# Patient Record
Sex: FEMALE | Race: ASIAN | NOT HISPANIC OR LATINO
[De-identification: names, ages, dates, MRNs, and addresses within clinical notes are randomized per-mention and may not be internally consistent; named-entity substitution may affect disease eponyms.]

---

## 2022-02-01 PROBLEM — Z00.00 ENCOUNTER FOR PREVENTIVE HEALTH EXAMINATION: Status: ACTIVE | Noted: 2022-02-01

## 2022-02-03 ENCOUNTER — APPOINTMENT (OUTPATIENT)
Dept: ANTEPARTUM | Facility: CLINIC | Age: 27
End: 2022-02-03
Payer: COMMERCIAL

## 2022-02-03 ENCOUNTER — APPOINTMENT (OUTPATIENT)
Dept: OBGYN | Facility: CLINIC | Age: 27
End: 2022-02-03
Payer: COMMERCIAL

## 2022-02-03 VITALS
HEIGHT: 66 IN | BODY MASS INDEX: 26.84 KG/M2 | DIASTOLIC BLOOD PRESSURE: 70 MMHG | WEIGHT: 167 LBS | SYSTOLIC BLOOD PRESSURE: 115 MMHG

## 2022-02-03 PROCEDURE — XXXXX: CPT

## 2022-02-03 PROCEDURE — 76811 OB US DETAILED SNGL FETUS: CPT

## 2022-02-03 PROCEDURE — 76817 TRANSVAGINAL US OBSTETRIC: CPT

## 2022-02-03 RX ORDER — ASCORBIC ACID, CHOLECALCIFEROL, .ALPHA.-TOCOPHEROL ACETATE, DL-, PYRIDOXINE, FOLIC ACID, CYANOCOBALAMIN, CALCIUM, FERROUS FUMARATE, MAGNESIUM, DOCONEXENT 85; 200; 10; 25; 1; 12; 140; 27; 45; 300 [IU]/1; [IU]/1; [IU]/1; [IU]/1; MG/1; UG/1; MG/1; MG/1; MG/1; MG/1
27-0.6-0.4-3 CAPSULE, GELATIN COATED ORAL
Qty: 30 | Refills: 8 | Status: ACTIVE | COMMUNITY
Start: 2022-02-03 | End: 1900-01-01

## 2022-03-03 ENCOUNTER — APPOINTMENT (OUTPATIENT)
Dept: OBGYN | Facility: CLINIC | Age: 27
End: 2022-03-03
Payer: COMMERCIAL

## 2022-03-03 VITALS
HEIGHT: 66 IN | WEIGHT: 174 LBS | BODY MASS INDEX: 27.97 KG/M2 | DIASTOLIC BLOOD PRESSURE: 75 MMHG | SYSTOLIC BLOOD PRESSURE: 119 MMHG

## 2022-03-03 DIAGNOSIS — Z3A.20 20 WEEKS GESTATION OF PREGNANCY: ICD-10-CM

## 2022-03-03 PROCEDURE — 0502F SUBSEQUENT PRENATAL CARE: CPT

## 2022-03-28 ENCOUNTER — NON-APPOINTMENT (OUTPATIENT)
Age: 27
End: 2022-03-28

## 2022-03-31 ENCOUNTER — NON-APPOINTMENT (OUTPATIENT)
Age: 27
End: 2022-03-31

## 2022-03-31 ENCOUNTER — APPOINTMENT (OUTPATIENT)
Dept: OBGYN | Facility: CLINIC | Age: 27
End: 2022-03-31
Payer: COMMERCIAL

## 2022-03-31 VITALS — DIASTOLIC BLOOD PRESSURE: 74 MMHG | SYSTOLIC BLOOD PRESSURE: 117 MMHG | WEIGHT: 178 LBS | BODY MASS INDEX: 28.73 KG/M2

## 2022-03-31 PROCEDURE — 0502F SUBSEQUENT PRENATAL CARE: CPT

## 2022-04-01 ENCOUNTER — NON-APPOINTMENT (OUTPATIENT)
Age: 27
End: 2022-04-01

## 2022-04-01 LAB
BASOPHILS # BLD AUTO: 0.01 K/UL
BASOPHILS NFR BLD AUTO: 0.1 %
EOSINOPHIL # BLD AUTO: 0.07 K/UL
EOSINOPHIL NFR BLD AUTO: 0.5 %
GLUCOSE 1H P 50 G GLC PO SERPL-MCNC: 139 MG/DL
HCT VFR BLD CALC: 27.9 %
HGB BLD-MCNC: 8.5 G/DL
IMM GRANULOCYTES NFR BLD AUTO: 0.8 %
LYMPHOCYTES # BLD AUTO: 1.77 K/UL
LYMPHOCYTES NFR BLD AUTO: 13.3 %
MAN DIFF?: NORMAL
MCHC RBC-ENTMCNC: 27.2 PG
MCHC RBC-ENTMCNC: 30.5 GM/DL
MCV RBC AUTO: 89.4 FL
MONOCYTES # BLD AUTO: 0.49 K/UL
MONOCYTES NFR BLD AUTO: 3.7 %
NEUTROPHILS # BLD AUTO: 10.82 K/UL
NEUTROPHILS NFR BLD AUTO: 81.6 %
PLATELET # BLD AUTO: 318 K/UL
RBC # BLD: 3.12 M/UL
RBC # FLD: 13.1 %
WBC # FLD AUTO: 13.27 K/UL

## 2022-04-14 ENCOUNTER — APPOINTMENT (OUTPATIENT)
Dept: ANTEPARTUM | Facility: CLINIC | Age: 27
End: 2022-04-14
Payer: COMMERCIAL

## 2022-04-14 ENCOUNTER — APPOINTMENT (OUTPATIENT)
Dept: OBGYN | Facility: CLINIC | Age: 27
End: 2022-04-14
Payer: COMMERCIAL

## 2022-04-14 VITALS
DIASTOLIC BLOOD PRESSURE: 53 MMHG | SYSTOLIC BLOOD PRESSURE: 91 MMHG | HEIGHT: 66 IN | BODY MASS INDEX: 29.57 KG/M2 | WEIGHT: 184 LBS

## 2022-04-14 PROCEDURE — 0502F SUBSEQUENT PRENATAL CARE: CPT

## 2022-04-14 PROCEDURE — 76819 FETAL BIOPHYS PROFIL W/O NST: CPT

## 2022-04-14 PROCEDURE — 76816 OB US FOLLOW-UP PER FETUS: CPT

## 2022-04-18 ENCOUNTER — APPOINTMENT (OUTPATIENT)
Dept: OBGYN | Facility: CLINIC | Age: 27
End: 2022-04-18

## 2022-04-28 ENCOUNTER — NON-APPOINTMENT (OUTPATIENT)
Age: 27
End: 2022-04-28

## 2022-04-28 ENCOUNTER — APPOINTMENT (OUTPATIENT)
Dept: OBGYN | Facility: CLINIC | Age: 27
End: 2022-04-28
Payer: COMMERCIAL

## 2022-04-28 VITALS
HEIGHT: 66 IN | WEIGHT: 184 LBS | SYSTOLIC BLOOD PRESSURE: 125 MMHG | BODY MASS INDEX: 29.57 KG/M2 | DIASTOLIC BLOOD PRESSURE: 78 MMHG

## 2022-04-28 DIAGNOSIS — Z3A.24 24 WEEKS GESTATION OF PREGNANCY: ICD-10-CM

## 2022-04-28 PROCEDURE — 0502F SUBSEQUENT PRENATAL CARE: CPT

## 2022-04-28 PROCEDURE — 90715 TDAP VACCINE 7 YRS/> IM: CPT

## 2022-04-28 PROCEDURE — 90471 IMMUNIZATION ADMIN: CPT

## 2022-05-02 LAB — FOLATE SERPL-MCNC: 15.3 NG/ML

## 2022-05-12 ENCOUNTER — APPOINTMENT (OUTPATIENT)
Dept: OBGYN | Facility: CLINIC | Age: 27
End: 2022-05-12
Payer: COMMERCIAL

## 2022-05-12 VITALS — WEIGHT: 189 LBS | DIASTOLIC BLOOD PRESSURE: 74 MMHG | BODY MASS INDEX: 30.51 KG/M2 | SYSTOLIC BLOOD PRESSURE: 106 MMHG

## 2022-05-12 DIAGNOSIS — Z3A.34 34 WEEKS GESTATION OF PREGNANCY: ICD-10-CM

## 2022-05-12 DIAGNOSIS — O99.019 ANEMIA COMPLICATING PREGNANCY, UNSPECIFIED TRIMESTER: ICD-10-CM

## 2022-05-12 DIAGNOSIS — Z33.1 PREGNANT STATE, INCIDENTAL: ICD-10-CM

## 2022-05-12 DIAGNOSIS — Z3A.32 32 WEEKS GESTATION OF PREGNANCY: ICD-10-CM

## 2022-05-12 PROCEDURE — 0502F SUBSEQUENT PRENATAL CARE: CPT

## 2022-05-12 RX ORDER — DOXYLAMINE SUCCINATE AND PYRIDOXINE HYDROCHLORIDE 10; 10 MG/1; MG/1
10-10 TABLET, DELAYED RELEASE ORAL
Refills: 0 | Status: COMPLETED | COMMUNITY
End: 2022-05-12

## 2022-05-12 RX ORDER — IRON/IRON ASP GLY/FA/MV-MIN 38 125-25-1MG
TABLET ORAL
Refills: 0 | Status: ACTIVE | COMMUNITY

## 2022-05-26 ENCOUNTER — APPOINTMENT (OUTPATIENT)
Dept: OBGYN | Facility: CLINIC | Age: 27
End: 2022-05-26
Payer: COMMERCIAL

## 2022-05-26 VITALS
DIASTOLIC BLOOD PRESSURE: 74 MMHG | WEIGHT: 193 LBS | BODY MASS INDEX: 31.02 KG/M2 | HEIGHT: 66 IN | SYSTOLIC BLOOD PRESSURE: 111 MMHG

## 2022-05-26 PROCEDURE — 0502F SUBSEQUENT PRENATAL CARE: CPT

## 2022-06-01 LAB
B-HEM STREP SPEC QL CULT: NORMAL
FERRITIN SERPL-MCNC: 13 NG/ML
HIV1+2 AB SPEC QL IA.RAPID: NONREACTIVE
IRON SATN MFR SERPL: 6 %
IRON SERPL-MCNC: 34 UG/DL
TIBC SERPL-MCNC: 544 UG/DL
UIBC SERPL-MCNC: 510 UG/DL
VIT B12 SERPL-MCNC: <150 PG/ML

## 2022-06-02 ENCOUNTER — APPOINTMENT (OUTPATIENT)
Dept: OBGYN | Facility: CLINIC | Age: 27
End: 2022-06-02
Payer: COMMERCIAL

## 2022-06-02 VITALS
WEIGHT: 199 LBS | SYSTOLIC BLOOD PRESSURE: 110 MMHG | HEIGHT: 66 IN | BODY MASS INDEX: 31.98 KG/M2 | DIASTOLIC BLOOD PRESSURE: 76 MMHG

## 2022-06-02 PROCEDURE — 0502F SUBSEQUENT PRENATAL CARE: CPT

## 2022-06-10 ENCOUNTER — APPOINTMENT (OUTPATIENT)
Dept: OBGYN | Facility: CLINIC | Age: 27
End: 2022-06-10

## 2022-06-10 ENCOUNTER — APPOINTMENT (OUTPATIENT)
Dept: ANTEPARTUM | Facility: CLINIC | Age: 27
End: 2022-06-10
Payer: COMMERCIAL

## 2022-06-10 VITALS
WEIGHT: 194 LBS | HEIGHT: 66 IN | SYSTOLIC BLOOD PRESSURE: 134 MMHG | DIASTOLIC BLOOD PRESSURE: 84 MMHG | BODY MASS INDEX: 31.18 KG/M2

## 2022-06-10 DIAGNOSIS — Z3A.38 38 WEEKS GESTATION OF PREGNANCY: ICD-10-CM

## 2022-06-10 DIAGNOSIS — Z34.90 ENCOUNTER FOR SUPERVISION OF NORMAL PREGNANCY, UNSPECIFIED, UNSPECIFIED TRIMESTER: ICD-10-CM

## 2022-06-10 PROCEDURE — 76819 FETAL BIOPHYS PROFIL W/O NST: CPT

## 2022-06-10 PROCEDURE — 0502F SUBSEQUENT PRENATAL CARE: CPT

## 2022-06-10 PROCEDURE — 76816 OB US FOLLOW-UP PER FETUS: CPT

## 2022-06-16 ENCOUNTER — APPOINTMENT (OUTPATIENT)
Dept: OBGYN | Facility: CLINIC | Age: 27
End: 2022-06-16

## 2022-06-16 VITALS
HEIGHT: 66 IN | WEIGHT: 195 LBS | SYSTOLIC BLOOD PRESSURE: 113 MMHG | BODY MASS INDEX: 31.34 KG/M2 | DIASTOLIC BLOOD PRESSURE: 67 MMHG

## 2022-06-16 PROCEDURE — 0502F SUBSEQUENT PRENATAL CARE: CPT

## 2022-06-18 ENCOUNTER — OUTPATIENT (OUTPATIENT)
Dept: INPATIENT UNIT | Facility: HOSPITAL | Age: 27
LOS: 1 days | Discharge: HOME | End: 2022-06-18

## 2022-06-18 VITALS — HEART RATE: 85 BPM | SYSTOLIC BLOOD PRESSURE: 99 MMHG | DIASTOLIC BLOOD PRESSURE: 56 MMHG

## 2022-06-18 VITALS
HEART RATE: 116 BPM | TEMPERATURE: 98 F | RESPIRATION RATE: 16 BRPM | SYSTOLIC BLOOD PRESSURE: 111 MMHG | DIASTOLIC BLOOD PRESSURE: 76 MMHG

## 2022-06-18 DIAGNOSIS — Z3A.00 WEEKS OF GESTATION OF PREGNANCY NOT SPECIFIED: ICD-10-CM

## 2022-06-18 DIAGNOSIS — O26.899 OTHER SPECIFIED PREGNANCY RELATED CONDITIONS, UNSPECIFIED TRIMESTER: ICD-10-CM

## 2022-06-18 NOTE — OB PROVIDER TRIAGE NOTE - NSHPPHYSICALEXAM_GEN_ALL_CORE
pt seen and examined   ICU Vital Signs Last 24 Hrs  T(C): 36.9 (18 Jun 2022 11:20), Max: 36.9 (18 Jun 2022 11:15)  T(F): 98.42 (18 Jun 2022 11:20), Max: 98.42 (18 Jun 2022 11:20)  HR: 85 (18 Jun 2022 12:03) (85 - 116)  BP: 99/56 (18 Jun 2022 12:03) (99/56 - 111/76)  BP(mean): --  ABP: --  ABP(mean): --  RR: 16 (18 Jun 2022 11:15) (16 - 16)  SpO2: --  pt in NAD    pt does not appear to be in labor   lungs clear   hear s1 s2   abd soft   placed in EFM   FHR  reactive no contraction pattern   VE attempted unable to exam  tried x2     vaginismus suspected   declined SSE as well      Dr Potter aware

## 2022-06-18 NOTE — OB RN TRIAGE NOTE - FALL HARM RISK - UNIVERSAL INTERVENTIONS
Bed in lowest position, wheels locked, appropriate side rails in place/Call bell, personal items and telephone in reach/Instruct patient to call for assistance before getting out of bed or chair/Non-slip footwear when patient is out of bed/[place] to call system/Physically safe environment - no spills, clutter or unnecessary equipment/Purposeful Proactive Rounding/Room/bathroom lighting operational, light cord in reach

## 2022-06-18 NOTE — OB PROVIDER TRIAGE NOTE - HISTORY OF PRESENT ILLNESS
27 year old female P0 at 39.5 weeks gestation  27 year old female P0 at 39.5 weeks gestation who presents with contractions q 5 minutes and stated pain 8/10    denied any LOF VB   feels GFM     denied any ap issues denied any fetal issues    stated EFW  1 weeks ago  6/10/22  8#      med hx denied   surghx denied   meds pnvqd   allergies nkda  Ob hx  P0   gyn hx denied

## 2022-06-18 NOTE — OB PROVIDER TRIAGE NOTE - NSOBPROVIDERNOTE_OBGYN_ALL_OB_FT
27 year old female P0 at 39 weeks  no evidence of active labor    no contraction pattern on NST   RNST   declined VE   vaginismus suspected  clinically does not appear in active labor at this time        case d/w Dr abebe   discharge home   labor instruction reviewed advised to return if stronger contractions  any VB any LOF  any Dec FM   office follow up next week   FM counts reviewed   Prasanth NOVA

## 2022-06-19 ENCOUNTER — INPATIENT (INPATIENT)
Facility: HOSPITAL | Age: 27
LOS: 2 days | Discharge: HOME | End: 2022-06-22
Attending: OBSTETRICS & GYNECOLOGY | Admitting: OBSTETRICS & GYNECOLOGY

## 2022-06-19 ENCOUNTER — TRANSCRIPTION ENCOUNTER (OUTPATIENT)
Age: 27
End: 2022-06-19

## 2022-06-19 VITALS
DIASTOLIC BLOOD PRESSURE: 59 MMHG | RESPIRATION RATE: 20 BRPM | HEIGHT: 66 IN | SYSTOLIC BLOOD PRESSURE: 100 MMHG | HEART RATE: 82 BPM | WEIGHT: 179.9 LBS | TEMPERATURE: 98 F

## 2022-06-19 DIAGNOSIS — Z3A.00 WEEKS OF GESTATION OF PREGNANCY NOT SPECIFIED: ICD-10-CM

## 2022-06-19 DIAGNOSIS — O26.899 OTHER SPECIFIED PREGNANCY RELATED CONDITIONS, UNSPECIFIED TRIMESTER: ICD-10-CM

## 2022-06-19 PROBLEM — Z78.9 OTHER SPECIFIED HEALTH STATUS: Chronic | Status: ACTIVE | Noted: 2022-06-18

## 2022-06-19 LAB
ANISOCYTOSIS BLD QL: SLIGHT — SIGNIFICANT CHANGE UP
BASOPHILS # BLD AUTO: 0 K/UL — SIGNIFICANT CHANGE UP (ref 0–0.2)
BASOPHILS NFR BLD AUTO: 0 % — SIGNIFICANT CHANGE UP (ref 0–2)
BLD GP AB SCN SERPL QL: POSITIVE — SIGNIFICANT CHANGE UP
COVID-19 SPIKE DOMAIN AB INTERP: POSITIVE
COVID-19 SPIKE DOMAIN ANTIBODY RESULT: >250 U/ML — HIGH
EOSINOPHIL # BLD AUTO: 0 K/UL — SIGNIFICANT CHANGE UP (ref 0–0.5)
EOSINOPHIL NFR BLD AUTO: 0 % — SIGNIFICANT CHANGE UP (ref 0–6)
HCT VFR BLD CALC: 32.6 % — LOW (ref 34.5–45)
HGB BLD-MCNC: 10.2 G/DL — LOW (ref 11.5–15.5)
IANC: 20.01 K/UL — HIGH (ref 1.8–7.4)
LYMPHOCYTES # BLD AUTO: 0.2 K/UL — LOW (ref 1–3.3)
LYMPHOCYTES # BLD AUTO: 0.9 % — LOW (ref 13–44)
MCHC RBC-ENTMCNC: 25.9 PG — LOW (ref 27–34)
MCHC RBC-ENTMCNC: 31.3 GM/DL — LOW (ref 32–36)
MCV RBC AUTO: 82.7 FL — SIGNIFICANT CHANGE UP (ref 80–100)
MONOCYTES # BLD AUTO: 1.17 K/UL — HIGH (ref 0–0.9)
MONOCYTES NFR BLD AUTO: 5.2 % — SIGNIFICANT CHANGE UP (ref 2–14)
NEUTROPHILS # BLD AUTO: 21.14 K/UL — HIGH (ref 1.8–7.4)
NEUTROPHILS NFR BLD AUTO: 93.9 % — HIGH (ref 43–77)
PLAT MORPH BLD: NORMAL — SIGNIFICANT CHANGE UP
PLATELET # BLD AUTO: 331 K/UL — SIGNIFICANT CHANGE UP (ref 150–400)
PLATELET COUNT - ESTIMATE: NORMAL — SIGNIFICANT CHANGE UP
POIKILOCYTOSIS BLD QL AUTO: SLIGHT — SIGNIFICANT CHANGE UP
RBC # BLD: 3.94 M/UL — SIGNIFICANT CHANGE UP (ref 3.8–5.2)
RBC # FLD: 15.9 % — HIGH (ref 10.3–14.5)
RBC BLD AUTO: NORMAL — SIGNIFICANT CHANGE UP
RH IG SCN BLD-IMP: POSITIVE — SIGNIFICANT CHANGE UP
RH IG SCN BLD-IMP: POSITIVE — SIGNIFICANT CHANGE UP
SARS-COV-2 IGG+IGM SERPL QL IA: >250 U/ML — HIGH
SARS-COV-2 IGG+IGM SERPL QL IA: POSITIVE
SARS-COV-2 RNA SPEC QL NAA+PROBE: SIGNIFICANT CHANGE UP
WBC # BLD: 22.51 K/UL — HIGH (ref 3.8–10.5)
WBC # FLD AUTO: 22.51 K/UL — HIGH (ref 3.8–10.5)

## 2022-06-19 PROCEDURE — 59510 CESAREAN DELIVERY: CPT | Mod: U9

## 2022-06-19 RX ORDER — SODIUM CHLORIDE 9 MG/ML
1000 INJECTION, SOLUTION INTRAVENOUS ONCE
Refills: 0 | Status: COMPLETED | OUTPATIENT
Start: 2022-06-19 | End: 2022-06-19

## 2022-06-19 RX ORDER — INFLUENZA VIRUS VACCINE 15; 15; 15; 15 UG/.5ML; UG/.5ML; UG/.5ML; UG/.5ML
0.5 SUSPENSION INTRAMUSCULAR ONCE
Refills: 0 | Status: DISCONTINUED | OUTPATIENT
Start: 2022-06-19 | End: 2022-06-22

## 2022-06-19 RX ORDER — OXYTOCIN 10 UNIT/ML
VIAL (ML) INJECTION
Qty: 20 | Refills: 0 | Status: DISCONTINUED | OUTPATIENT
Start: 2022-06-19 | End: 2022-06-20

## 2022-06-19 RX ORDER — SODIUM CHLORIDE 9 MG/ML
1000 INJECTION, SOLUTION INTRAVENOUS
Refills: 0 | Status: DISCONTINUED | OUTPATIENT
Start: 2022-06-19 | End: 2022-06-20

## 2022-06-19 RX ORDER — MORPHINE SULFATE 50 MG/1
4 CAPSULE, EXTENDED RELEASE ORAL ONCE
Refills: 0 | Status: DISCONTINUED | OUTPATIENT
Start: 2022-06-19 | End: 2022-06-19

## 2022-06-19 RX ORDER — OXYTOCIN 10 UNIT/ML
VIAL (ML) INJECTION
Qty: 30 | Refills: 0 | Status: DISCONTINUED | OUTPATIENT
Start: 2022-06-19 | End: 2022-06-20

## 2022-06-19 RX ORDER — ACETAMINOPHEN 500 MG
975 TABLET ORAL ONCE
Refills: 0 | Status: COMPLETED | OUTPATIENT
Start: 2022-06-19 | End: 2022-06-19

## 2022-06-19 RX ADMIN — Medication 975 MILLIGRAM(S): at 23:18

## 2022-06-19 RX ADMIN — MORPHINE SULFATE 4 MILLIGRAM(S): 50 CAPSULE, EXTENDED RELEASE ORAL at 12:15

## 2022-06-19 RX ADMIN — Medication 2 MILLIUNIT(S)/MIN: at 23:28

## 2022-06-19 RX ADMIN — MORPHINE SULFATE 4 MILLIGRAM(S): 50 CAPSULE, EXTENDED RELEASE ORAL at 11:40

## 2022-06-19 RX ADMIN — MORPHINE SULFATE 4 MILLIGRAM(S): 50 CAPSULE, EXTENDED RELEASE ORAL at 11:41

## 2022-06-19 RX ADMIN — Medication 975 MILLIGRAM(S): at 00:00

## 2022-06-19 RX ADMIN — SODIUM CHLORIDE 1000 MILLILITER(S): 9 INJECTION, SOLUTION INTRAVENOUS at 23:16

## 2022-06-19 NOTE — OB PROVIDER TRIAGE NOTE - NSHPPHYSICALEXAM_GEN_ALL_CORE
Vital Signs Last 24 Hrs  T(C): 36.9 (19 Jun 2022 10:12), Max: 36.9 (18 Jun 2022 11:15)  T(F): 98.4 (19 Jun 2022 10:12), Max: 98.42 (18 Jun 2022 11:20)  HR: 82 (19 Jun 2022 10:12) (82 - 116)  BP: 100/59 (19 Jun 2022 10:12) (99/56 - 111/76)  BP(mean): --  RR: 20 (19 Jun 2022 10:12) (16 - 20)  SpO2: --    A&O x3  CTAB  abdomen: gravid, soft, nontender  Pt declined SVE, vaginismus suspected, pt states she " scared of vaginal exam"   NST: 130 baseline, moderate variabiloty, positive accels, no decels, mild irregular contractions

## 2022-06-19 NOTE — OB PROVIDER H&P - ASSESSMENT
This is a 27 year old  at 39.6 weeks gestational age admitted for theraputic rest    plan discussed with dr abebe  admit for theraputic rest  Mosrphone 4 mg IVP/ 4 mg SubQ  routine orders  covid swab collected and sent  This is a 27 year old  at 39.6 weeks gestational age admitted for theraputic rest    plan discussed with dr abebe  admit for theraputic rest  Mosrphone 4 mg IVP/ 4 mg SubQ  routine orders  covid swab collected and sent       Attending Addendum: In room to discuss plan of care with patient. Pt is a P0 at 39+6wga presenting for the second day with irregular contractions. Upon evaluation yesterday, patient unable to tolerate digital exam and today declines a pelvic exam. We discussed different management options including morphine rest, epidural placement followed by IOL. Pt and her partner report that she has not slept in over 3 days and is very tired. She is also scared of the pain and would like for it to stop. Discussed that we typically do not perform elective PCSs as this has risks of intraabdominal surgery and also has added risks to any future pregnancies. We also discussed that a  section is a major abdominal surgery which will cause her to be in significant pain afterwards. She would like to proceed with morphine rest and then follow up with Dr. Arvizu tomorrow to discuss the plan for delivery.   Per patient, Dr. Arvizu previously stated that she should be delivered around 40 weeks but mentioned both induction and  section.    JEFFREY Manriquez MD

## 2022-06-19 NOTE — OB RN PATIENT PROFILE - FALL HARM RISK - UNIVERSAL INTERVENTIONS
Bed in lowest position, wheels locked, appropriate side rails in place/Call bell, personal items and telephone in reach/Instruct patient to call for assistance before getting out of bed or chair/Non-slip footwear when patient is out of bed/[place] to call system/Physically safe environment - no spills, clutter or unnecessary equipment/Purposeful Proactive Rounding/Room/bathroom lighting operational, light cord in reach

## 2022-06-19 NOTE — OB PROVIDER TRIAGE NOTE - HISTORY OF PRESENT ILLNESS
This is a 27 year old  at 39.6 weeks gestational age presents with complaints of spotting and contractions q 5-10 minutes. Reports +GFM< denies LOF.   Pt was seen and evaluated yesterday  in D&T for similar complaints, however was unable to tolerate SVE or SSE.   Pt returned today with same complaints, however is refusing SVE. Pt states she is " scared of vaginal delivery" and " desires c/s"  AP course complicated by:  - 8 lbs efw on 6/10  GBS neg     med: anemia  surg: denies  gyn: denies  ob: denies  current meds: pnv, iron  NKDA

## 2022-06-19 NOTE — OB PROVIDER TRIAGE NOTE - NSOBPROVIDERNOTE_OBGYN_ALL_OB_FT
0845  Dr Abebe aware that patient is uncomfortable with contractions, but is declining SVE and requesting primary c/s    0900  Dr Abebe at bedside discussing plan of care with patient. All patient questions and concerns answered. Plan for therapeutic rest and morphine for pain management    This is a 27 year old  at 39.6 weeks gestational age admitted for theraputic rest    plan discussed with dr abebe  admit for theraputic rest  Mosrphone 4 mg IVP/ 4 mg SubQ  routine orders  covid swab collected and sent

## 2022-06-20 LAB — T PALLIDUM AB TITR SER: NEGATIVE — SIGNIFICANT CHANGE UP

## 2022-06-20 DEVICE — ARISTA 3GR: Type: IMPLANTABLE DEVICE | Status: FUNCTIONAL

## 2022-06-20 RX ORDER — LANOLIN
1 OINTMENT (GRAM) TOPICAL EVERY 6 HOURS
Refills: 0 | Status: DISCONTINUED | OUTPATIENT
Start: 2022-06-20 | End: 2022-06-22

## 2022-06-20 RX ORDER — SODIUM CHLORIDE 9 MG/ML
1000 INJECTION, SOLUTION INTRAVENOUS
Refills: 0 | Status: DISCONTINUED | OUTPATIENT
Start: 2022-06-20 | End: 2022-06-20

## 2022-06-20 RX ORDER — HEPARIN SODIUM 5000 [USP'U]/ML
5000 INJECTION INTRAVENOUS; SUBCUTANEOUS EVERY 12 HOURS
Refills: 0 | Status: DISCONTINUED | OUTPATIENT
Start: 2022-06-20 | End: 2022-06-22

## 2022-06-20 RX ORDER — KETOROLAC TROMETHAMINE 30 MG/ML
30 SYRINGE (ML) INJECTION EVERY 6 HOURS
Refills: 0 | Status: DISCONTINUED | OUTPATIENT
Start: 2022-06-20 | End: 2022-06-21

## 2022-06-20 RX ORDER — ACETAMINOPHEN 500 MG
975 TABLET ORAL
Refills: 0 | Status: DISCONTINUED | OUTPATIENT
Start: 2022-06-20 | End: 2022-06-22

## 2022-06-20 RX ORDER — IBUPROFEN 200 MG
600 TABLET ORAL EVERY 6 HOURS
Refills: 0 | Status: DISCONTINUED | OUTPATIENT
Start: 2022-06-20 | End: 2022-06-22

## 2022-06-20 RX ORDER — DIPHENHYDRAMINE HCL 50 MG
25 CAPSULE ORAL EVERY 6 HOURS
Refills: 0 | Status: COMPLETED | OUTPATIENT
Start: 2022-06-20 | End: 2023-05-19

## 2022-06-20 RX ORDER — OXYTOCIN 10 UNIT/ML
333.33 VIAL (ML) INJECTION
Qty: 20 | Refills: 0 | Status: DISCONTINUED | OUTPATIENT
Start: 2022-06-20 | End: 2022-06-20

## 2022-06-20 RX ORDER — AMPICILLIN TRIHYDRATE 250 MG
CAPSULE ORAL
Refills: 0 | Status: DISCONTINUED | OUTPATIENT
Start: 2022-06-20 | End: 2022-06-20

## 2022-06-20 RX ORDER — OXYCODONE HYDROCHLORIDE 5 MG/1
5 TABLET ORAL
Refills: 0 | Status: DISCONTINUED | OUTPATIENT
Start: 2022-06-20 | End: 2022-06-22

## 2022-06-20 RX ORDER — MAGNESIUM HYDROXIDE 400 MG/1
30 TABLET, CHEWABLE ORAL
Refills: 0 | Status: DISCONTINUED | OUTPATIENT
Start: 2022-06-20 | End: 2022-06-22

## 2022-06-20 RX ORDER — IBUPROFEN 200 MG
600 TABLET ORAL EVERY 6 HOURS
Refills: 0 | Status: COMPLETED | OUTPATIENT
Start: 2022-06-20 | End: 2023-05-19

## 2022-06-20 RX ORDER — AMPICILLIN TRIHYDRATE 250 MG
2 CAPSULE ORAL EVERY 6 HOURS
Refills: 0 | Status: DISCONTINUED | OUTPATIENT
Start: 2022-06-20 | End: 2022-06-20

## 2022-06-20 RX ORDER — OXYCODONE HYDROCHLORIDE 5 MG/1
5 TABLET ORAL ONCE
Refills: 0 | Status: DISCONTINUED | OUTPATIENT
Start: 2022-06-20 | End: 2022-06-22

## 2022-06-20 RX ORDER — AMPICILLIN TRIHYDRATE 250 MG
2 CAPSULE ORAL ONCE
Refills: 0 | Status: DISCONTINUED | OUTPATIENT
Start: 2022-06-20 | End: 2022-06-20

## 2022-06-20 RX ORDER — SIMETHICONE 80 MG/1
80 TABLET, CHEWABLE ORAL EVERY 4 HOURS
Refills: 0 | Status: DISCONTINUED | OUTPATIENT
Start: 2022-06-20 | End: 2022-06-22

## 2022-06-20 RX ORDER — GENTAMICIN SULFATE 40 MG/ML
340 VIAL (ML) INJECTION ONCE
Refills: 0 | Status: DISCONTINUED | OUTPATIENT
Start: 2022-06-20 | End: 2022-06-20

## 2022-06-20 RX ORDER — TETANUS TOXOID, REDUCED DIPHTHERIA TOXOID AND ACELLULAR PERTUSSIS VACCINE, ADSORBED 5; 2.5; 8; 8; 2.5 [IU]/.5ML; [IU]/.5ML; UG/.5ML; UG/.5ML; UG/.5ML
0.5 SUSPENSION INTRAMUSCULAR ONCE
Refills: 0 | Status: DISCONTINUED | OUTPATIENT
Start: 2022-06-20 | End: 2022-06-22

## 2022-06-20 RX ADMIN — Medication 975 MILLIGRAM(S): at 18:05

## 2022-06-20 RX ADMIN — SIMETHICONE 80 MILLIGRAM(S): 80 TABLET, CHEWABLE ORAL at 12:55

## 2022-06-20 RX ADMIN — Medication 975 MILLIGRAM(S): at 13:25

## 2022-06-20 RX ADMIN — Medication 30 MILLIGRAM(S): at 15:40

## 2022-06-20 RX ADMIN — Medication 975 MILLIGRAM(S): at 06:52

## 2022-06-20 RX ADMIN — HEPARIN SODIUM 5000 UNIT(S): 5000 INJECTION INTRAVENOUS; SUBCUTANEOUS at 08:53

## 2022-06-20 RX ADMIN — Medication 975 MILLIGRAM(S): at 23:28

## 2022-06-20 RX ADMIN — Medication 975 MILLIGRAM(S): at 12:55

## 2022-06-20 RX ADMIN — HEPARIN SODIUM 5000 UNIT(S): 5000 INJECTION INTRAVENOUS; SUBCUTANEOUS at 20:36

## 2022-06-20 RX ADMIN — Medication 30 MILLIGRAM(S): at 21:10

## 2022-06-20 RX ADMIN — Medication 30 MILLIGRAM(S): at 09:20

## 2022-06-20 RX ADMIN — Medication 30 MILLIGRAM(S): at 20:35

## 2022-06-20 RX ADMIN — Medication 30 MILLIGRAM(S): at 16:10

## 2022-06-20 RX ADMIN — Medication 30 MILLIGRAM(S): at 08:50

## 2022-06-20 RX ADMIN — SIMETHICONE 80 MILLIGRAM(S): 80 TABLET, CHEWABLE ORAL at 18:05

## 2022-06-20 RX ADMIN — Medication 975 MILLIGRAM(S): at 18:35

## 2022-06-20 NOTE — OB PROVIDER DELIVERY SUMMARY - NSSELHIDDEN_OBGYN_ALL_OB_FT
[NS_DeliveryAttending1_OBGYN_ALL_OB_FT:[id number]],[NS_DeliveryAssist1_OBGYN_ALL_OB_FT:[email or web address]=],[NS_DeliveryRN_OBGYN_ALL_OB_FT:[id number]=]

## 2022-06-20 NOTE — OB RN DELIVERY SUMMARY - BABY A: VOID IN DELIVERY
Agree with above note. f/u AM HELLP labs and final head CT read. BPs well controlled on labetalol 300mg TID and procardia 60mg XL. Continue to monitor. Gabapentin and for headache per anesthesia recommendations. Agree with above note. Patient seen at bedside, reports feeling much better, headache has greatly improved. Denies chest pain, SOB, nausea/vomiting, calf pain.  f/u AM HELLP labs and final head CT read. BPs well controlled on labetalol 300mg TID and procardia 60mg XL. Continue to monitor. Gabapentin and for headache per anesthesia recommendations. s/p 12 hours magnesium sulfate. no

## 2022-06-20 NOTE — LACTATION INITIAL EVALUATION - LACTATION INTERVENTIONS
Primary RN made aware of consult ./initiate/review safe skin-to-skin/initiate/review hand expression/initiate/review techniques for position and latch/post discharge community resources provided/initiate/review breast massage/compression/reviewed components of an effective feeding and at least 8 effective feedings per day required/reviewed importance of monitoring infant diapers, the breastfeeding log, and minimum output each day/reviewed risks of unnecessary formula supplementation/reviewed benefits and recommendations for rooming in/reviewed feeding on demand/by cue at least 8 times a day

## 2022-06-20 NOTE — DISCHARGE NOTE OB - CARE PLAN
1 Principal Discharge DX:	Vaginal delivery  Assessment and plan of treatment:	OOB, reg diet, analgesia PRN, pelvic rest

## 2022-06-20 NOTE — OB NEONATOLOGY/PEDIATRICIAN DELIVERY SUMMARY - NSPEDSNEONOTESA_OBGYN_ALL_OB_FT
Pediatrician called to delivery for cat II FHT and meconium. Female infant born at 40wks via unscheduled primary  to a 28 y/o  blood type A+ mother. Maternal history of anemia. No significant prenatal history. Prenatal labs HIV neg, HBsAg neg, Rubella NI, and RPR nr, GBS - on , COVID neg. SROM at 12:13a on  with heavy meconium fluids. Baby emerged vigorous, crying. Cord clamping delayed 30sec. Infant was brought to radiant warmer and warmed, dried, stimulated and suctioned. HR>100, normal respiratory effort. APGARS of 9/9. Mom is initiating breast feeding. Consents to Hepatitis B vaccination. EOS score 0.42, maternal highest temp 38.1C. Pediatrician is Lukasz  Baby stable for transfer to  nursery. Parents updated.

## 2022-06-20 NOTE — DISCHARGE NOTE OB - PATIENT PORTAL LINK FT
You can access the FollowMyHealth Patient Portal offered by [facility] by registering at the following website: [email or web address]. By joining [facility]’s FollowMyHealth portal, you will also be able to view your health information using other applications (apps) compatible with our system.

## 2022-06-20 NOTE — OB PROVIDER LABOR PROGRESS NOTE - NS_SUBJECTIVE/OBJECTIVE_OBGYN_ALL_OB_FT
Patient seen at bedside, variable decelerations noted on EFM.    on SVE, head noted to be asynclitic, -3. Bulging bag.     Pt repositioned to high fowlers
2 min spontaneous decel resolved spontaneously. Head feels less engaged, off-center towards maternal right.
Patient feels more rectal pressure
Patient seen at bedside, deceleration noted on EFM

## 2022-06-20 NOTE — PROGRESS NOTE ADULT - SUBJECTIVE AND OBJECTIVE BOX
S: Patient doing well. No complaints. Minimal lochia. Pain controlled.    O: Vital Signs Last 24 Hrs  T(C): 36.7 (20 Jun 2022 10:27), Max: 38.1 (19 Jun 2022 22:55)  T(F): 98.1 (20 Jun 2022 10:27), Max: 100.58 (19 Jun 2022 22:55)  HR: 88 (20 Jun 2022 10:27) (68 - 150)  BP: 99/52 (20 Jun 2022 10:27) (85/49 - 130/62)  BP(mean): 74 (20 Jun 2022 05:30) (51 - 90)  RR: 18 (20 Jun 2022 10:27) (14 - 20)  SpO2: 98% (20 Jun 2022 10:27) (93% - 100%)    Gen: NAD  Abd: soft, Nontender, Nondistended, fundus firm  Ext: no tendern, mild edema    Labs:                        10.2   22.51 )-----------( 331      ( 19 Jun 2022 11:06 )             32.6       A: 27y POD#1 s/p c/s doing well.    Plan:  Routine postpartum care  Encouraged out of bed  Regular diet

## 2022-06-20 NOTE — OB PROVIDER LABOR PROGRESS NOTE - ASSESSMENT
26yo  @39+6 in labor    - peanut ball on left side  - SVE: /-3, head not engaged  - FHT Cat 2, overall reassuring, ctm  - For pitocin when tracing recovers to Cat 1    d/w Dr. Ledy Alfred, PGY1
28y/o F in labor with decelerations noted on EFM, now sp SROM, making cervical change. Patient comfortable.      - Peanut ball to allow for continued passive descent of fetal head   - Holding Pitocin to allow for continued tracing recovery, patient making spontaneous change   - Epidural effective     dw Dr. Campa OB Safety   Dr Villafana in house   Jennifer Aponte MD, PGY4 
27y  at 40w presenting with painful contractions, in labor:    - Pt positioned in high fowlers to allow for descent of fetal vertex. Pitocin paused to allow for tracing recovery.       dw Dr Lokesh Aponte MD, PGY4 
28yo  @39+6 in labor    - SVE: /-2, bulging bag, head not engaged  - FHT Cat 1, reassuring, ctm  - High Urena's  - c/w exp management  - Expect     d/w Dr. Ledy lAfred, PGY1

## 2022-06-20 NOTE — OB RN INTRAOPERATIVE NOTE - NSSELHIDDEN_OBGYN_ALL_OB_FT
[NS_DeliveryAttending1_OBGYN_ALL_OB_FT:[id number]],[NS_DeliveryAssist1_OBGYN_ALL_OB_FT:[email or web address]=],[NS_DeliveryRN_OBGYN_ALL_OB_FT:[id number]=]

## 2022-06-20 NOTE — DISCHARGE NOTE OB - NS MD DC FALL RISK RISK
For information on Fall & Injury Prevention, visit: [email or web address] OR  [email or web address] OR  https://www.cdc.gov/steadi/patient.html

## 2022-06-20 NOTE — OB RN DELIVERY SUMMARY - NS_LABORCHARACTER_OBGYN_ALL_OB
Augmentation of labor/Febrile (>38C)/Internal electronic FM/External electronic FM/Meconium staining

## 2022-06-20 NOTE — OB RN DELIVERY SUMMARY - NS_SEPSISRSKCALC_OBGYN_ALL_OB_FT
EOS calculated successfully. EOS Risk Factor: 0.5/1000 live births ([facility] national incidence); GA=40w;Temp=100.58; ROM=1.4; GBS='Negative'; Antibiotics='No antibiotics or any antibiotics < 2 hrs prior to birth'

## 2022-06-20 NOTE — OB RN DELIVERY SUMMARY - NSSELHIDDEN_OBGYN_ALL_OB_FT
[NS_DeliveryAttending1_OBGYN_ALL_OB_FT:[id number]],[NS_DeliveryAssist1_OBGYN_ALL_OB_FT:[email or web address]=],[NS_DeliveryRN_OBGYN_ALL_OB_FT:[id number]=]

## 2022-06-20 NOTE — OB PROVIDER LABOR PROGRESS NOTE - NS_OBIHIFHRDETAILS_OBGYN_ALL_OB_FT
Cat 1: 140/mod variability/ + accels/ - decels
140 moderate variability +intermittent variable decelerations
Cat 2: 120/mod variability/ - accels/ spontaneous decels
145 moderate variability +accels -decels

## 2022-06-20 NOTE — OB PROVIDER DELIVERY SUMMARY - NSPROVIDERDELIVERYNOTE_OBGYN_ALL_OB_FT
primary LTCS for NRFHT, uncomplicated  viable female infant, vertex presentation, 3280g, Apgars 9/9, cord gasses sent  Grossly normal fallopian tubes, uterus, and ovaries  Hysterotomy closed in 1 layer  Donato used over hysterotomy    QBL: 744  IVF: 1800  UOP: 150    Viria PGY2

## 2022-06-21 LAB
BASOPHILS # BLD AUTO: 0.02 K/UL — SIGNIFICANT CHANGE UP (ref 0–0.2)
BASOPHILS # BLD AUTO: 0.03 K/UL — SIGNIFICANT CHANGE UP (ref 0–0.2)
BASOPHILS NFR BLD AUTO: 0.1 % — SIGNIFICANT CHANGE UP (ref 0–2)
BASOPHILS NFR BLD AUTO: 0.2 % — SIGNIFICANT CHANGE UP (ref 0–2)
EOSINOPHIL # BLD AUTO: 0.08 K/UL — SIGNIFICANT CHANGE UP (ref 0–0.5)
EOSINOPHIL # BLD AUTO: 0.1 K/UL — SIGNIFICANT CHANGE UP (ref 0–0.5)
EOSINOPHIL NFR BLD AUTO: 0.4 % — SIGNIFICANT CHANGE UP (ref 0–6)
EOSINOPHIL NFR BLD AUTO: 0.5 % — SIGNIFICANT CHANGE UP (ref 0–6)
HCT VFR BLD CALC: 20.6 % — CRITICAL LOW (ref 34.5–45)
HCT VFR BLD CALC: 26.4 % — LOW (ref 34.5–45)
HGB BLD-MCNC: 6.6 G/DL — CRITICAL LOW (ref 11.5–15.5)
HGB BLD-MCNC: 8.7 G/DL — LOW (ref 11.5–15.5)
IANC: 14.62 K/UL — HIGH (ref 1.8–7.4)
IANC: 16.93 K/UL — HIGH (ref 1.8–7.4)
IMM GRANULOCYTES NFR BLD AUTO: 0.9 % — SIGNIFICANT CHANGE UP (ref 0–1.5)
IMM GRANULOCYTES NFR BLD AUTO: 1.2 % — SIGNIFICANT CHANGE UP (ref 0–1.5)
LYMPHOCYTES # BLD AUTO: 10.3 % — LOW (ref 13–44)
LYMPHOCYTES # BLD AUTO: 13.7 % — SIGNIFICANT CHANGE UP (ref 13–44)
LYMPHOCYTES # BLD AUTO: 2.07 K/UL — SIGNIFICANT CHANGE UP (ref 1–3.3)
LYMPHOCYTES # BLD AUTO: 2.52 K/UL — SIGNIFICANT CHANGE UP (ref 1–3.3)
MCHC RBC-ENTMCNC: 26 PG — LOW (ref 27–34)
MCHC RBC-ENTMCNC: 26.8 PG — LOW (ref 27–34)
MCHC RBC-ENTMCNC: 32 GM/DL — SIGNIFICANT CHANGE UP (ref 32–36)
MCHC RBC-ENTMCNC: 33 GM/DL — SIGNIFICANT CHANGE UP (ref 32–36)
MCV RBC AUTO: 81.1 FL — SIGNIFICANT CHANGE UP (ref 80–100)
MCV RBC AUTO: 81.2 FL — SIGNIFICANT CHANGE UP (ref 80–100)
MONOCYTES # BLD AUTO: 0.64 K/UL — SIGNIFICANT CHANGE UP (ref 0–0.9)
MONOCYTES # BLD AUTO: 0.92 K/UL — HIGH (ref 0–0.9)
MONOCYTES NFR BLD AUTO: 3.2 % — SIGNIFICANT CHANGE UP (ref 2–14)
MONOCYTES NFR BLD AUTO: 5 % — SIGNIFICANT CHANGE UP (ref 2–14)
NEUTROPHILS # BLD AUTO: 14.62 K/UL — HIGH (ref 1.8–7.4)
NEUTROPHILS # BLD AUTO: 16.93 K/UL — HIGH (ref 1.8–7.4)
NEUTROPHILS NFR BLD AUTO: 79.8 % — HIGH (ref 43–77)
NEUTROPHILS NFR BLD AUTO: 84.7 % — HIGH (ref 43–77)
NRBC # BLD: 0 /100 WBCS — SIGNIFICANT CHANGE UP
NRBC # BLD: 0 /100 WBCS — SIGNIFICANT CHANGE UP
NRBC # FLD: 0 K/UL — SIGNIFICANT CHANGE UP
NRBC # FLD: 0 K/UL — SIGNIFICANT CHANGE UP
PLATELET # BLD AUTO: 248 K/UL — SIGNIFICANT CHANGE UP (ref 150–400)
PLATELET # BLD AUTO: 267 K/UL — SIGNIFICANT CHANGE UP (ref 150–400)
RBC # BLD: 2.54 M/UL — LOW (ref 3.8–5.2)
RBC # BLD: 3.25 M/UL — LOW (ref 3.8–5.2)
RBC # FLD: 15.4 % — HIGH (ref 10.3–14.5)
RBC # FLD: 16.2 % — HIGH (ref 10.3–14.5)
WBC # BLD: 18.33 K/UL — HIGH (ref 3.8–10.5)
WBC # BLD: 20.01 K/UL — HIGH (ref 3.8–10.5)
WBC # FLD AUTO: 18.33 K/UL — HIGH (ref 3.8–10.5)
WBC # FLD AUTO: 20.01 K/UL — HIGH (ref 3.8–10.5)

## 2022-06-21 RX ORDER — DIPHENHYDRAMINE HCL 50 MG
25 CAPSULE ORAL EVERY 6 HOURS
Refills: 0 | Status: DISCONTINUED | OUTPATIENT
Start: 2022-06-21 | End: 2022-06-22

## 2022-06-21 RX ORDER — ASCORBIC ACID 60 MG
500 TABLET,CHEWABLE ORAL DAILY
Refills: 0 | Status: DISCONTINUED | OUTPATIENT
Start: 2022-06-21 | End: 2022-06-22

## 2022-06-21 RX ORDER — FERROUS SULFATE 325(65) MG
325 TABLET ORAL THREE TIMES A DAY
Refills: 0 | Status: DISCONTINUED | OUTPATIENT
Start: 2022-06-21 | End: 2022-06-22

## 2022-06-21 RX ORDER — SENNA PLUS 8.6 MG/1
1 TABLET ORAL
Refills: 0 | Status: DISCONTINUED | OUTPATIENT
Start: 2022-06-21 | End: 2022-06-22

## 2022-06-21 RX ADMIN — Medication 600 MILLIGRAM(S): at 14:50

## 2022-06-21 RX ADMIN — Medication 500 MILLIGRAM(S): at 12:05

## 2022-06-21 RX ADMIN — Medication 325 MILLIGRAM(S): at 14:50

## 2022-06-21 RX ADMIN — Medication 325 MILLIGRAM(S): at 22:34

## 2022-06-21 RX ADMIN — Medication 600 MILLIGRAM(S): at 15:40

## 2022-06-21 RX ADMIN — HEPARIN SODIUM 5000 UNIT(S): 5000 INJECTION INTRAVENOUS; SUBCUTANEOUS at 08:46

## 2022-06-21 RX ADMIN — Medication 975 MILLIGRAM(S): at 06:06

## 2022-06-21 RX ADMIN — Medication 600 MILLIGRAM(S): at 20:18

## 2022-06-21 RX ADMIN — Medication 975 MILLIGRAM(S): at 06:30

## 2022-06-21 RX ADMIN — Medication 975 MILLIGRAM(S): at 12:05

## 2022-06-21 RX ADMIN — Medication 975 MILLIGRAM(S): at 00:10

## 2022-06-21 RX ADMIN — Medication 975 MILLIGRAM(S): at 18:09

## 2022-06-21 RX ADMIN — Medication 600 MILLIGRAM(S): at 20:49

## 2022-06-21 RX ADMIN — Medication 975 MILLIGRAM(S): at 13:00

## 2022-06-21 RX ADMIN — Medication 600 MILLIGRAM(S): at 08:46

## 2022-06-21 RX ADMIN — Medication 600 MILLIGRAM(S): at 09:25

## 2022-06-21 RX ADMIN — Medication 25 MILLIGRAM(S): at 08:45

## 2022-06-21 RX ADMIN — Medication 975 MILLIGRAM(S): at 18:37

## 2022-06-21 RX ADMIN — HEPARIN SODIUM 5000 UNIT(S): 5000 INJECTION INTRAVENOUS; SUBCUTANEOUS at 20:18

## 2022-06-21 NOTE — PROGRESS NOTE ADULT - ASSESSMENT
A/P: 28yo POD#1 s/p LTCS c/b isolated maternal temp.  Patient is stable and doing well post-operatively. Now remains afebrile postpartum.     - s/p Tylenol for isolated temp, cont monitoring per routine   - Continue regular diet  - Increase ambulation  - Continue motrin, tylenol, oxycodone PRN for pain control.    - f/u AM CBC    Cherri Roper MD PGY3

## 2022-06-21 NOTE — PROGRESS NOTE ADULT - SUBJECTIVE AND OBJECTIVE BOX
Pain Service Follow-up  Postop Day  1    S/P  C- Section    T(C): 36.7 (06-21-22 @ 10:05), Max: 36.9 (06-20-22 @ 13:34)  HR: 97 (06-21-22 @ 10:05) (77 - 100)  BP: 109/61 (06-21-22 @ 10:05) (97/63 - 115/62)  RR: 18 (06-21-22 @ 10:05) (17 - 20)  SpO2: 100% (06-21-22 @ 10:05) (98% - 100%)  Wt(kg): --      THERAPY:     S/P Epidural Morphine    Sedation Score:	  [X] Alert	      [  ] Drowsy       [  ] Arousable	[  ] Asleep         [  ] Unresponsive    Side Effects:	  [X] None	      [  ] Nausea       [  ] Pruritus        [  ] Weakness   [  ] Numbness        ASSESSMENT/ PLAN   [ X ] Discontinue         [  ] Continue    [ X ] Documentation and Verification of current medications       Satisfactory Post Anesthetic Course

## 2022-06-21 NOTE — PROGRESS NOTE ADULT - NSPROGADDITIONALINFOA_GEN_ALL_CORE
Pt seen and examined and agree with above  pt currently receiving 2U prbc     T(C): 36.7 (06-21-22 @ 10:05), Max: 36.9 (06-20-22 @ 13:34)  HR: 97 (06-21-22 @ 10:05) (77 - 100)  BP: 109/61 (06-21-22 @ 10:05) (97/63 - 115/62)  RR: 18 (06-21-22 @ 10:05) (17 - 20)  SpO2: 100% (06-21-22 @ 10:05) (98% - 100%)                            6.6    18.33 )-----------( 248      ( 21 Jun 2022 05:56 )             20.6     COntinue current management

## 2022-06-21 NOTE — CHART NOTE - NSCHARTNOTEFT_GEN_A_CORE
Delayed Entry from 1400    In room to check patient and discuss plan of care. The patient reports that the contractions continue to be painful despite morphine rest. Cervical exam desired, but unable to pass introitus due to patient anxiety and pain. Patient feels like most of her anxiety and discomfort is related to her not sleeping for multiple days. In order to make patient comfortable, will proceed with epidural placement. Will then plan to let patient rest and check cervix when able. All questions answered.    JEFFREY Manriquez MD
In room to check patient.  VE: 7/90/-1 with bulging membranes.  FHT category 2 due to intermittent variables/lates. Overall reactive with moderate variability and accels.  Will continue expectant management.    JEFFREY Manriquez MD
OBGYN Chart Note    Pt with NRFHT; fully dilated however, head is high.  Consents signed for  section.    Elsie Villafana MD
R3 Chart Note     Patient seen and evaluated at bedside 2/2 deceleration noted on EFM.   Patient noted to SROM, meconium.     ISE applied.     Spontaneous recovery of tracing to baseline with repositioning.      - Continue to hold Pitocin     Dr Campa OB Safety in room   Jennifer Aponte PGY3
At bedside for H+H 6.6/20.6    Pt endorsing fatigue. Denies dizziness, CP, SOB. Denies fainting spells.     Vital Signs (24 Hrs):  T(C): 36.6 (06-21-22 @ 06:22), Max: 36.9 (06-20-22 @ 13:34)  HR: 81 (06-21-22 @ 06:22) (81 - 100)  BP: 97/63 (06-21-22 @ 06:22) (97/63 - 105/65)  RR: 17 (06-21-22 @ 06:22) (17 - 18)  SpO2: 98% (06-21-22 @ 06:22) (98% - 100%)  Wt(kg): --    PE  Abdomen: soft, non tender, non distended  Extremities: no edema, no erythema, no tenderness     A/P: POD1 s/p LTCS . Chronic anemia with super imposed acute blood loss anemia. VSS, benign exam. Pt endorsing fatigue.   - Discussed risks and benefits of blood transfusions with the pt. Pt amenable to 2u pRBC  - Transfuse 2u pRBC  - f/u 4 hour post transfusion CBC    Reena Sim, PGY2  d/w Dr. Be

## 2022-06-21 NOTE — PROGRESS NOTE ADULT - SUBJECTIVE AND OBJECTIVE BOX
OB Progress Note:  Delivery, POD#1    S: 28yo POD#1 s/p LTCS c/b isolated maternal temp. Postpartum, patient remains afebrile. Pain well controlled, tolerating regular diet, not yet passing flatus, ambulating without difficulty, voiding spontaneously. Denies heavy vaginal bleeding, N/V, CP/SOB/lightheadedness/dizziness.     O:   Vital Signs Last 24 Hrs  T(C): 36.5 (2022 22:35), Max: 36.9 (2022 13:34)  T(F): 97.7 (2022 22:35), Max: 98.4 (2022 13:34)  HR: 100 (2022 22:35) (68 - 100)  BP: 102/56 (2022 22:35) (97/60 - 110/64)  BP(mean): 74 (2022 05:30) (66 - 90)  RR: 18 (2022 22:35) (18 - 20)  SpO2: 100% (2022 22:35) (97% - 100%)    Labs:  Blood type: A Positive  Rubella IgG: RPR: Negative                          10.2<L>   22.51<H> >-----------< 331    ( 19 @ 11:06 )             32.6<L>                  PE:  General: NAD  CV: RRR  Resp: CTAB  Abdomen: Mildly distended, appropriately tender, Fundus firm, incision c/d/i.  : Nl lochia  Extremities: No erythema, no pitting edema     OB Progress Note:  Delivery, POD#1    S: 26yo POD#1 s/p LTCS c/b isolated maternal temp. Postpartum, patient remains afebrile. Pain well controlled, tolerating regular diet, not yet passing flatus, ambulating without difficulty, voiding spontaneously. Denies heavy vaginal bleeding, N/V, CP/SOB/lightheadedness/dizziness.     O:   Vital Signs Last 24 Hrs  T(C): 36.5 (2022 22:35), Max: 36.9 (2022 13:34)  T(F): 97.7 (2022 22:35), Max: 98.4 (2022 13:34)  HR: 100 (2022 22:35) (68 - 100)  BP: 102/56 (2022 22:35) (97/60 - 110/64)  BP(mean): 74 (2022 05:30) (66 - 90)  RR: 18 (2022 22:35) (18 - 20)  SpO2: 100% (2022 22:35) (97% - 100%)    Labs:  Blood type: A Positive  Rubella IgG: RPR: Negative                          10.2<L>   22.51<H> >-----------< 331    ( 19 @ 11:06 )             32.6<L>                  PE:  General: NAD  CV: RRR  Resp: CTAB  Abdomen: Mildly distended, appropriately tender, Fundus firm, incision c/d/i. No fundal tenderness  : Nl lochia  Extremities: No erythema, no pitting edema

## 2022-06-22 VITALS
OXYGEN SATURATION: 100 % | RESPIRATION RATE: 18 BRPM | HEART RATE: 81 BPM | DIASTOLIC BLOOD PRESSURE: 79 MMHG | TEMPERATURE: 98 F | SYSTOLIC BLOOD PRESSURE: 118 MMHG

## 2022-06-22 LAB
BASOPHILS # BLD AUTO: 0.03 K/UL — SIGNIFICANT CHANGE UP (ref 0–0.2)
BASOPHILS NFR BLD AUTO: 0.2 % — SIGNIFICANT CHANGE UP (ref 0–2)
EOSINOPHIL # BLD AUTO: 0.15 K/UL — SIGNIFICANT CHANGE UP (ref 0–0.5)
EOSINOPHIL NFR BLD AUTO: 0.8 % — SIGNIFICANT CHANGE UP (ref 0–6)
HCT VFR BLD CALC: 28.4 % — LOW (ref 34.5–45)
HGB BLD-MCNC: 9.5 G/DL — LOW (ref 11.5–15.5)
IANC: 15.14 K/UL — HIGH (ref 1.8–7.4)
IMM GRANULOCYTES NFR BLD AUTO: 1.2 % — SIGNIFICANT CHANGE UP (ref 0–1.5)
LYMPHOCYTES # BLD AUTO: 1.9 K/UL — SIGNIFICANT CHANGE UP (ref 1–3.3)
LYMPHOCYTES # BLD AUTO: 10.4 % — LOW (ref 13–44)
MCHC RBC-ENTMCNC: 27.3 PG — SIGNIFICANT CHANGE UP (ref 27–34)
MCHC RBC-ENTMCNC: 33.5 GM/DL — SIGNIFICANT CHANGE UP (ref 32–36)
MCV RBC AUTO: 81.6 FL — SIGNIFICANT CHANGE UP (ref 80–100)
MONOCYTES # BLD AUTO: 0.89 K/UL — SIGNIFICANT CHANGE UP (ref 0–0.9)
MONOCYTES NFR BLD AUTO: 4.9 % — SIGNIFICANT CHANGE UP (ref 2–14)
NEUTROPHILS # BLD AUTO: 15.14 K/UL — HIGH (ref 1.8–7.4)
NEUTROPHILS NFR BLD AUTO: 82.5 % — HIGH (ref 43–77)
NRBC # BLD: 0 /100 WBCS — SIGNIFICANT CHANGE UP
NRBC # FLD: 0 K/UL — SIGNIFICANT CHANGE UP
PLATELET # BLD AUTO: 306 K/UL — SIGNIFICANT CHANGE UP (ref 150–400)
RBC # BLD: 3.48 M/UL — LOW (ref 3.8–5.2)
RBC # FLD: 15.9 % — HIGH (ref 10.3–14.5)
WBC # BLD: 18.33 K/UL — HIGH (ref 3.8–10.5)
WBC # FLD AUTO: 18.33 K/UL — HIGH (ref 3.8–10.5)

## 2022-06-22 RX ORDER — IBUPROFEN 200 MG
1 TABLET ORAL
Qty: 0 | Refills: 0 | DISCHARGE
Start: 2022-06-22

## 2022-06-22 RX ADMIN — Medication 600 MILLIGRAM(S): at 10:00

## 2022-06-22 RX ADMIN — Medication 975 MILLIGRAM(S): at 13:10

## 2022-06-22 RX ADMIN — Medication 500 MILLIGRAM(S): at 12:34

## 2022-06-22 RX ADMIN — Medication 975 MILLIGRAM(S): at 00:07

## 2022-06-22 RX ADMIN — HEPARIN SODIUM 5000 UNIT(S): 5000 INJECTION INTRAVENOUS; SUBCUTANEOUS at 09:04

## 2022-06-22 RX ADMIN — Medication 975 MILLIGRAM(S): at 05:55

## 2022-06-22 RX ADMIN — Medication 600 MILLIGRAM(S): at 16:02

## 2022-06-22 RX ADMIN — Medication 325 MILLIGRAM(S): at 16:02

## 2022-06-22 RX ADMIN — Medication 975 MILLIGRAM(S): at 12:34

## 2022-06-22 RX ADMIN — Medication 600 MILLIGRAM(S): at 09:04

## 2022-06-22 RX ADMIN — Medication 975 MILLIGRAM(S): at 00:36

## 2022-06-22 RX ADMIN — Medication 325 MILLIGRAM(S): at 05:56

## 2022-06-22 RX ADMIN — Medication 600 MILLIGRAM(S): at 17:00

## 2022-06-22 RX ADMIN — Medication 975 MILLIGRAM(S): at 06:27

## 2022-06-22 NOTE — PROGRESS NOTE ADULT - SUBJECTIVE AND OBJECTIVE BOX
S: 28yo  POD#2 s/p pLTCS for category II tracing. Isolated maternal temp x1 intrapartum; s/p Tylenol. Patient is s/p blood transfusion of 2u PRBCs on  for low H&H. Pain is well controlled. She is tolerating a regular diet, passing flatus, voiding spontaneously, and ambulating without difficulty. Denies CP, SOB, lightheadedness, dizziness, N/V.    O:  Vital Signs Last 24 Hrs  T(C): 36.2 (2022 06:00), Max: 37 (2022 17:16)  T(F): 97.1 (2022 06:00), Max: 98.6 (2022 17:16)  HR: 70 (2022 06:00) (69 - 97)  BP: 101/70 (2022 06:00) (95/45 - 112/71)  BP(mean): --  RR: 18 (2022 06:00) (18 - 18)  SpO2: 100% (2022 06:00) (100% - 100%)    MEDICATIONS  (STANDING):  acetaminophen     Tablet .. 975 milliGRAM(s) Oral <User Schedule>  ascorbic acid 500 milliGRAM(s) Oral daily  diphtheria/tetanus/pertussis (acellular) Vaccine (ADAcel) 0.5 milliLiter(s) IntraMuscular once  ferrous    sulfate 325 milliGRAM(s) Oral three times a day  heparin   Injectable 5000 Unit(s) SubCutaneous every 12 hours  ibuprofen  Tablet. 600 milliGRAM(s) Oral every 6 hours  influenza   Vaccine 0.5 milliLiter(s) IntraMuscular once      MEDICATIONS  (PRN):  diphenhydrAMINE 25 milliGRAM(s) Oral every 6 hours PRN Pruritus  lanolin Ointment 1 Application(s) Topical every 6 hours PRN Sore Nipples  magnesium hydroxide Suspension 30 milliLiter(s) Oral two times a day PRN Constipation  oxyCODONE    IR 5 milliGRAM(s) Oral every 3 hours PRN Moderate to Severe Pain (4-10)  oxyCODONE    IR 5 milliGRAM(s) Oral once PRN Moderate to Severe Pain (4-10)  senna 1 Tablet(s) Oral two times a day PRN Constipation  simethicone 80 milliGRAM(s) Chew every 4 hours PRN Gas      Labs:  Blood type: A Positive  Rubella IgG: RPR: Negative                          9.5<L>   18.33<H> >-----------< 306    (  @ 08:18 )             28.4<L>                        8.7<L>   20.01<H> >-----------< 267    (  @ 21:40 )             26.4<L>                        6.6<LL>   18.33<H> >-----------< 248    (  @ 05:56 )             20.6<LL>                        10.2<L>   22.51<H> >-----------< 331    (  @ 11:06 )             32.6<L>    PE:  General: NAD  Breasts: Nontender, non-engorged  Abdomen: Soft, appropriately tender, incision with dermabond c/d/i.   Lochia: WNL  Extremities: No calf tenderness or edema    A/P: 26 yo  POD#2 s/p pLTCS.  Patient is stable and doing well post-operatively.    - CBC drawn this AM  - H&H stable. WBCs downstrending  - Continue regular diet.  - Increase ambulation.  - Encouraged use of abdominal binder  - Continue motrin, tylenol, oxycodone PRN for pain control.    - Discharge Planning     Beverly CANO S: 28yo  POD#2 s/p pLTCS for category II tracing. Isolated maternal temp x1 intrapartum; s/p Tylenol. Patient is s/p blood transfusion of 2u PRBCs on  for low H&H. Pain is well controlled. She is tolerating a regular diet, passing flatus, voiding spontaneously, and ambulating without difficulty. Denies CP, SOB, lightheadedness, dizziness, N/V.    O:  Vital Signs Last 24 Hrs  T(C): 36.2 (2022 06:00), Max: 37 (2022 17:16)  T(F): 97.1 (2022 06:00), Max: 98.6 (2022 17:16)  HR: 70 (2022 06:00) (69 - 97)  BP: 101/70 (2022 06:00) (95/45 - 112/71)  BP(mean): --  RR: 18 (2022 06:00) (18 - 18)  SpO2: 100% (2022 06:00) (100% - 100%)    MEDICATIONS  (STANDING):  acetaminophen     Tablet .. 975 milliGRAM(s) Oral <User Schedule>  ascorbic acid 500 milliGRAM(s) Oral daily  diphtheria/tetanus/pertussis (acellular) Vaccine (ADAcel) 0.5 milliLiter(s) IntraMuscular once  ferrous    sulfate 325 milliGRAM(s) Oral three times a day  heparin   Injectable 5000 Unit(s) SubCutaneous every 12 hours  ibuprofen  Tablet. 600 milliGRAM(s) Oral every 6 hours  influenza   Vaccine 0.5 milliLiter(s) IntraMuscular once      MEDICATIONS  (PRN):  diphenhydrAMINE 25 milliGRAM(s) Oral every 6 hours PRN Pruritus  lanolin Ointment 1 Application(s) Topical every 6 hours PRN Sore Nipples  magnesium hydroxide Suspension 30 milliLiter(s) Oral two times a day PRN Constipation  oxyCODONE    IR 5 milliGRAM(s) Oral every 3 hours PRN Moderate to Severe Pain (4-10)  oxyCODONE    IR 5 milliGRAM(s) Oral once PRN Moderate to Severe Pain (4-10)  senna 1 Tablet(s) Oral two times a day PRN Constipation  simethicone 80 milliGRAM(s) Chew every 4 hours PRN Gas      Labs:  Blood type: A Positive  Rubella IgG: RPR: Negative                          9.5<L>   18.33<H> >-----------< 306    (  @ 08:18 )             28.4<L>                        8.7<L>   20.01<H> >-----------< 267    (  @ 21:40 )             26.4<L>                        6.6<LL>   18.33<H> >-----------< 248    (  @ 05:56 )             20.6<LL>                        10.2<L>   22.51<H> >-----------< 331    (  @ 11:06 )             32.6<L>    PE:  General: NAD  Breasts: Nontender, non-engorged  Abdomen: Soft, appropriately tender, incision with dermabond c/d/i.   Lochia: WNL  Extremities: No calf tenderness or edema    A/P: 28 yo  POD#2 s/p pLTCS.  Patient is stable and doing well post-operatively.    - CBC drawn this AM  - H&H stable. WBCs downstrending  - Continue regular diet.  - Increase ambulation.  - Encouraged use of abdominal binder  - Continue motrin, tylenol, oxycodone PRN for pain control.   - Encouraged to continue Iron and Vitamin C supplements for anemia   - Discharge Planning     Beverly CANO

## 2022-06-23 ENCOUNTER — APPOINTMENT (OUTPATIENT)
Dept: ANTEPARTUM | Facility: CLINIC | Age: 27
End: 2022-06-23

## 2022-06-23 ENCOUNTER — APPOINTMENT (OUTPATIENT)
Dept: OBGYN | Facility: CLINIC | Age: 27
End: 2022-06-23

## 2022-07-12 ENCOUNTER — APPOINTMENT (OUTPATIENT)
Dept: OBGYN | Facility: CLINIC | Age: 27
End: 2022-07-12

## 2022-07-12 VITALS
BODY MASS INDEX: 28.93 KG/M2 | SYSTOLIC BLOOD PRESSURE: 131 MMHG | WEIGHT: 180 LBS | DIASTOLIC BLOOD PRESSURE: 89 MMHG | HEIGHT: 66 IN

## 2022-07-12 PROCEDURE — 0503F POSTPARTUM CARE VISIT: CPT

## 2022-07-12 NOTE — HISTORY OF PRESENT ILLNESS
[Complications:___] : no complications [Delivery Date: ___] : on [unfilled] [Primary C/S] : delivered by  section [Female] : Delivery History: baby girl [Wt. ___] : weighing [unfilled] [Breastfeeding] : currently nursing [BF with Difficulty] : nursing without difficulty [None] : No associated symptoms are reported [Clean/Dry/Intact] : clean, dry and intact [Erythema] : not erythematous [Swelling] : not swollen [Dehiscence] : not dehisced [Examination Of The Breasts] : breasts are normal [Doing Well] : is doing well [No Sign of Infection] : is showing no signs of infection [Excellent Pain Control] : has excellent pain control [FreeTextEntry8] : incision check [de-identified] : 41 weeks [de-identified] : breast and bottle feeding [FreeTextEntry1] : 28 y/o F presenting for incision check\par -Incision healing well\par -f/u for 6 week PP visit\par

## 2022-08-02 ENCOUNTER — APPOINTMENT (OUTPATIENT)
Dept: OBGYN | Facility: CLINIC | Age: 27
End: 2022-08-02

## 2022-08-02 VITALS — SYSTOLIC BLOOD PRESSURE: 105 MMHG | WEIGHT: 178 LBS | BODY MASS INDEX: 28.73 KG/M2 | DIASTOLIC BLOOD PRESSURE: 70 MMHG

## 2022-08-02 PROCEDURE — 0503F POSTPARTUM CARE VISIT: CPT

## 2022-08-02 RX ORDER — NORETHINDRONE 0.35 MG/1
0.35 TABLET ORAL
Qty: 1 | Refills: 3 | Status: ACTIVE | COMMUNITY
Start: 2022-08-02 | End: 1900-01-01

## 2022-08-02 NOTE — HISTORY OF PRESENT ILLNESS
[Delivery Date: ___] : on [unfilled] [Primary C/S] : delivered by  section [Female] : Delivery History: baby girl [Wt. ___] : weighing [unfilled] [Breastfeeding] : currently nursing [BF with Difficulty] : nursing without difficulty [Clean/Dry/Intact] : clean, dry and intact [Healed] : healed [Moderate] : moderate vaginal bleeding [Examination Of The Breasts] : breasts are normal [Doing Well] : is doing well [No Sign of Infection] : is showing no signs of infection [Excellent Pain Control] : has excellent pain control [None] : None [FreeTextEntry8] : post partum visit [FreeTextEntry9] : uncomplicated [de-identified] : 41 weeks [de-identified] : breast and bottle [de-identified] : pt reports return of menses on 7/31/22 [FreeTextEntry1] : 28 y/o F s/p C/S presenting for postpartum visit \par -incision healing well\par -Patient cleared to resume normal activity (sexual intercourse and exercise)\par -Patient counseled on contraception options, desires Ana\par -Patien denies vaginal exam at this time due to menses \par -f/u for 3 months for annual\par

## 2022-10-04 ENCOUNTER — APPOINTMENT (OUTPATIENT)
Dept: OBGYN | Facility: CLINIC | Age: 27
End: 2022-10-04

## 2023-03-31 NOTE — OB RN TRIAGE NOTE - FALL HARM RISK - PT AGE POPULATION HIDDEN
Body Location Override (Optional - Billing Will Still Be Based On Selected Body Map Location If Applicable): right upper back
Detail Level: Detailed
Add 34120 Cpt? (Important Note: In 2017 The Use Of 24202 Is Being Tracked By Cms To Determine Future Global Period Reimbursement For Global Periods): yes
Add 1585x Cpt? (Do Not Bill If You Billed For The Procedure Placing The Sutures. This Is An Add-On Code That Must Be Billed With An E/M Visit Code): No
Adult
